# Patient Record
Sex: MALE | Race: WHITE
[De-identification: names, ages, dates, MRNs, and addresses within clinical notes are randomized per-mention and may not be internally consistent; named-entity substitution may affect disease eponyms.]

---
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
## 2018-05-03 ENCOUNTER — HOSPITAL ENCOUNTER (EMERGENCY)
Dept: HOSPITAL 25 - UCEAST | Age: 18
Discharge: HOME | End: 2018-05-03
Payer: SELF-PAY

## 2018-05-03 VITALS — DIASTOLIC BLOOD PRESSURE: 66 MMHG | SYSTOLIC BLOOD PRESSURE: 108 MMHG

## 2018-05-03 DIAGNOSIS — J02.9: Primary | ICD-10-CM

## 2018-05-03 PROCEDURE — 99211 OFF/OP EST MAY X REQ PHY/QHP: CPT

## 2018-05-03 PROCEDURE — 87651 STREP A DNA AMP PROBE: CPT

## 2018-05-03 PROCEDURE — G0463 HOSPITAL OUTPT CLINIC VISIT: HCPCS

## 2018-05-03 NOTE — UC
Prabhu BAIG Angela, scribed for Kennedi Baez MD on 05/03/18 at 0813 .





 General HPI





- HPI Summary


HPI Summary: 





This pt is a 19 y/o male presenting to [facility] c/o sore throat that began last 

night. He states he was able to drink this morning with some difficulty 

secondary to pain. Denies drooling. Pt denies ear pain, sinus pain, nausea, 

vomiting, fever, chills, rash. He denies any sick contacts. His PCP is Dr. Oconnor.


 He is a marijuana user, the last time he used it was last night. 








Patients medication reviewed this visit.





- History of Current Complaint


Stated Complaint: SORE THROAT


Time Seen by Provider: 05/03/18 07:44


Hx Obtained From: Patient


Onset/Duration: Lasting Hours, Still Present


Timing: Constant


Current Severity: Moderate


Pain Intensity: 5


Pain Location at: throat


Character: ache


Aggravating: nothing


Alleviating: nothing


Associated Signs & Symptoms: Positive: Other - POS: sore throat. NEG: ear pain, 

sinus pain, chills, rash, drooling..  Negative: Cough, Fever, Nausea, Vomiting





- Allergy/Home Medications


Allergies/Adverse Reactions: 


 Allergies











Allergy/AdvReac Type Severity Reaction Status Date / Time


 


No Known Allergies Allergy   Verified 05/03/18 07:50














PMH/Surg Hx/FS Hx/Imm Hx


Previously Healthy: Yes


Other Endocrine History: DENIES: diabetes


Other Cardiovascular History: DENIES: HTN





- Surgical History


Surgical History: Yes


Surgery Procedure, Year, and Place: APPENDECTOMY





- Family History


Known Family History: Positive: Diabetes - grandmother and aunt





- Social History


Occupation: Employed Full-time -  in [place]


Lives: With Family


Alcohol Use: None


Substance Use Type: Marijuana


Substance Use Comment - Amount & Last Used: helps to sleep


Smoking Status (MU): Never Smoked Tobacco


Have You Smoked in the Last Year: No





- Immunization History


Vaccination Up to Date: Yes





Review of Systems


Constitutional: Negative


Skin: Negative


Eyes: Negative


ENT: Sore Throat, Other - NEG: ear ache, sinus pain


Respiratory: Negative


Cardiovascular: Negative


Gastrointestinal: Negative


Genitourinary: Negative


Motor: Negative


Neurovascular: Negative


Musculoskeletal: Negative


Neurological: Negative


Psychological: Negative


Is Patient Immunocompromised?: No


All Other Systems Reviewed And Are Negative: Yes





Physical Exam


Triage Information Reviewed: Yes


Appearance: Well-Appearing, No Pain Distress, Well-Nourished


Vital Signs: 


 Initial Vital Signs











Temp  98.3 F   05/03/18 07:52


 


Pulse  66   05/03/18 07:52


 


Resp  16   05/03/18 07:52


 


BP  108/66   05/03/18 07:52


 


Pulse Ox  97   05/03/18 07:52











Vital Signs Reviewed: Yes


Eye Exam: Normal


Eyes: Positive: Conjunctiva Clear


ENT Exam: Normal


ENT: Positive: Hearing grossly normal, Pharyngeal erythema, Nasal drainage, TMs 

normal, Uvula midline.  Negative: Tonsillar swelling, Tonsillar exudate, 

Muffled voice


Dental Exam: Normal


Neck exam: Normal


Neck: Positive: Supple, Nontender, No Lymphadenopathy


Respiratory Exam: Normal


Respiratory: Positive: Chest non-tender, Lungs clear, Normal breath sounds, No 

respiratory distress, No accessory muscle use


Cardiovascular Exam: Normal


Cardiovascular: Positive: RRR, No Murmur


Abdominal Exam: Normal


Abdomen Description: Positive: Nontender, No Organomegaly, Soft


Bowel Sounds: Positive: Present


Musculoskeletal Exam: Normal


Musculoskeletal: Positive: Strength Intact


Neurological Exam: Normal


Neurological: Positive: Alert


Psychological Exam: Normal


Psychological: Positive: Normal Response To Family


Skin Exam: Normal





Course/Dx





- Course


Course Of Treatment: Rapid strep test is negative.  Pt with sore throat x 12 

hours.  hydrate.  motrin/apap.  flonase.  cold fluids.  return precautions.  

secretion precautions





- Differential Dx - Multi-Symptom


Provider Diagnoses: pharyngitis





Discharge





- Sign-Out/Discharge


Documenting (check all that apply): Discharge/Admit/Transfer - Discharge





- Discharge Plan


Condition: Stable


Disposition: HOME


Patient Education Materials:  Pharyngitis (ED)


Referrals: 


Mynor Oconnor MD [Primary Care Provider] - 


Additional Instructions: 


- Okay to alternate ibuprofen (Advil, Motrin) and Tylenol every 3 hours for 

pain. Take with food. Do NOT take for more than 4-5 days


- Okay to gargle and spit every 4 hours as needed for pain


- Stay well hydrated - frequent sips of cold fluids will be soothing to your 

throat (popsicles, jello, ice cream, ice water). Avoid excess caffeine until 

your symptoms have resolved. 


- Do not share eating, drinking utensils. Throw out your toothbrush when your 

symptoms resolved


-Throat infections are spread by oral secretions - do not share eating or 

drinking utensils until you symptoms are resolved.  Clean items that may get 

your secretions such as cell phones, ipads, computer mouse, television remotes. 

Once you start to feel better, change your toothbrush and your pillowcase


- use nasal spray as instructed


-Okay to take a decongestant (Claritin-D, Allegra-D, Zyrtec-D)


- Contact your doctor to arrange a follow-up appointment as needed





- Billing Disposition and Condition


Condition: STABLE


Disposition: HOME





The documentation as recorded by the Prabhu steinberg Angela accurately reflects 

the service I personally performed and the decisions made by me, Kennedi Baez MD.

## 2018-08-01 ENCOUNTER — HOSPITAL ENCOUNTER (EMERGENCY)
Dept: HOSPITAL 25 - ED | Age: 18
Discharge: HOME | End: 2018-08-01
Payer: OTHER GOVERNMENT

## 2018-08-01 VITALS — SYSTOLIC BLOOD PRESSURE: 112 MMHG | DIASTOLIC BLOOD PRESSURE: 65 MMHG

## 2018-08-01 DIAGNOSIS — R19.5: ICD-10-CM

## 2018-08-01 DIAGNOSIS — K76.0: ICD-10-CM

## 2018-08-01 DIAGNOSIS — R07.9: ICD-10-CM

## 2018-08-01 DIAGNOSIS — R20.0: ICD-10-CM

## 2018-08-01 DIAGNOSIS — R10.84: Primary | ICD-10-CM

## 2018-08-01 LAB
BASOPHILS # BLD AUTO: 0 10^3/UL (ref 0–0.2)
EOSINOPHIL # BLD AUTO: 0 10^3/UL (ref 0–0.6)
HCT VFR BLD AUTO: 46 % (ref 42–52)
HGB BLD-MCNC: 15.7 G/DL (ref 14–18)
LYMPHOCYTES # BLD AUTO: 1.5 10^3/UL (ref 1–4.8)
MCH RBC QN AUTO: 29 PG (ref 27–31)
MCHC RBC AUTO-ENTMCNC: 34 G/DL (ref 31–36)
MCV RBC AUTO: 87 FL (ref 80–94)
MONOCYTES # BLD AUTO: 0.4 10^3/UL (ref 0–0.8)
NEUTROPHILS # BLD AUTO: 5.4 10^3/UL (ref 1.5–7.7)
NRBC # BLD AUTO: 0 10^3/UL
NRBC BLD QL AUTO: 0.3
PLATELET # BLD AUTO: 212 10^3/UL (ref 150–450)
RBC # BLD AUTO: 5.34 10^6/UL (ref 4–5.4)
WBC # BLD AUTO: 7.4 10^3/UL (ref 3.5–10.8)

## 2018-08-01 PROCEDURE — 80053 COMPREHEN METABOLIC PANEL: CPT

## 2018-08-01 PROCEDURE — 85025 COMPLETE CBC W/AUTO DIFF WBC: CPT

## 2018-08-01 PROCEDURE — 36415 COLL VENOUS BLD VENIPUNCTURE: CPT

## 2018-08-01 PROCEDURE — 96374 THER/PROPH/DIAG INJ IV PUSH: CPT

## 2018-08-01 PROCEDURE — 84484 ASSAY OF TROPONIN QUANT: CPT

## 2018-08-01 PROCEDURE — 93005 ELECTROCARDIOGRAM TRACING: CPT

## 2018-08-01 PROCEDURE — 74177 CT ABD & PELVIS W/CONTRAST: CPT

## 2018-08-01 PROCEDURE — 83690 ASSAY OF LIPASE: CPT

## 2018-08-01 PROCEDURE — 99283 EMERGENCY DEPT VISIT LOW MDM: CPT

## 2018-08-01 PROCEDURE — 86140 C-REACTIVE PROTEIN: CPT

## 2018-08-01 NOTE — ED
GI/ HPI





- HPI Summary


HPI Summary: 


18-year-old male presents with intermittent abdominal pain for the past couple 

months.  He states pain is generalized.  He states his pain is worst in the 

morning.  He states that he cannot eat in the morning.  He admits to nausea and 

vomiting.  He states his been having looser stools.  No family history of GI 

disorders.  No blood in his stool.  Standing and moving makes it worse.  He has 

been having occasional chest pain.  He states pain last couple seconds.  Most 

recent episode was today a couple hours ago.  It was sharp pain in the center 

of his chest.  He states he got a numbness into the legs.  The symptoms have 

resolved.  He is nonsmoker.  No pain or swelling in calf muscles.








- History of Current Complaint


Chief Complaint: EDAbdPain


Time Seen by Provider: 08/01/18 11:24


Stated Complaint: ABD PAIN/VOMITING


Pain Intensity: 2





- Allergy/Home Medications


Allergies/Adverse Reactions: 


 Allergies











Allergy/AdvReac Type Severity Reaction Status Date / Time


 


No Known Allergies Allergy   Verified 05/03/18 07:50











Home Medications: 


 Home Medications





Dexmethylphenidate HCl [Focalin] 2.5 mg PO DAILY 08/01/18 [History Confirmed 08/ 01/18]











PMH/Surg Hx/FS Hx/Imm Hx


Endocrine/Hematology History: 


   Denies: Hx Diabetes, Hx Thyroid Disease


Cardiovascular History: 


   Denies: Hx Hypertension


Respiratory History: 


   Denies: Hx Asthma, Hx Chronic Obstructive Pulmonary Disease (COPD)


GI History: 


   Denies: Hx Ulcer





- Surgical History


Surgery Procedure, Year, and Place: APPENDECTOMY


Infectious Disease History: No


Infectious Disease History: 


   Denies: Hx Clostridium Difficile, Hx Hepatitis, Hx Human Immunodeficiency 

Virus (HIV), Hx of Known/Suspected MRSA, Hx Tuberculosis, Traveled Outside the 

 in Last 30 Days





- Family History


Known Family History: Positive: Diabetes - grandmother and aunt, Other - no Gi 

diseases


   Negative: Cardiac Disease





- Social History


Alcohol Use: None


Hx Substance Use: No


Substance Use Type: Reports: Marijuana


Substance Use Comment - Amount & Last Used: helps to sleep


Hx Tobacco Use: No


Smoking Status (MU): Never Smoked Tobacco


Have You Smoked in the Last Year: No





Review of Systems


Negative: Fever


Positive: Chest Pain


Negative: Shortness Of Breath


Positive: Abdominal Pain, Vomiting, Diarrhea, Nausea


All Other Systems Reviewed And Are Negative: Yes





Physical Exam


Triage Information Reviewed: Yes


Vital Signs On Initial Exam: 


 Initial Vitals











Temp Pulse Resp BP Pulse Ox


 


 98.4 F   78   18   135/76   100 


 


 08/01/18 11:11  08/01/18 11:11  08/01/18 11:11  08/01/18 11:11  08/01/18 11:11











Vital Signs Reviewed: Yes


Appearance: Positive: Well-Appearing


Skin: Positive: Warm, Dry


Head/Face: Positive: Normal Head/Face Inspection


Eyes: Positive: Normal, Conjunctiva Clear


ENT: Positive: Pharynx normal


Respiratory/Lung Sounds: Positive: Clear to Auscultation, Breath Sounds Present


Cardiovascular: Positive: Normal, RRR


Abdomen Description: Positive: Soft, Other: - mild diffuse tenderness


Bowel Sounds: Positive: Present


Musculoskeletal: Positive: Normal


Neurological: Positive: Normal


Psychiatric: Positive: Normal





Diagnostics





- Vital Signs


 Vital Signs











  Temp Pulse Resp BP Pulse Ox


 


 08/01/18 11:11  98.4 F  78  18  135/76  100














- Laboratory


Result Diagrams: 


 08/01/18 11:44





 08/01/18 11:44


Lab Statement: Any lab studies that have been ordered have been reviewed, and 

results considered in the medical decision making process.





- CT


  ** abd


CT Interpretation: No Acute Changes


CT Interpretation Completed By: Radiologist





- EKG


  ** No standard instances


Cardiac Rate: NL


EKG Rhythm: Sinus Rhythm


EKG Interpretation: sinus rhythm





Re-Evaluation





- Re-Evaluation


  ** First Eval


Re-Evaluation Time: 12:52


Change: Improved


Comment: no abd pain, chest pain, went over lab results





GIGU Course/Dx





- Course


Course Of Treatment: 18-year-old male presents with intermittent abdominal pain 

for the past couple months.  He states pain is generalized.  He states his pain 

is worst in the morning.  He states that he cannot eat in the morning.  He 

admits to nausea and vomiting.  He states his been having looser stools.  No 

family history of GI disorders.  No blood in his stool.  Standing and moving 

makes it worse.  He has been having occasional chest pain.  He states pain last 

couple seconds.  Most recent episode was today a couple hours ago.  It was 

sharp pain in the center of his chest.  He states he got a numbness into the 

legs.  The symptoms have resolved.  He is nonsmoker.  No pain or swelling in 

calf muscles.  on exam mild diffuse abdomen tenderness. ekg normal sinus 

rhythm. troponin neg. wbc and crp normal. CT shows fatty liver.  will try 

zofran and pecpid and give referral to GI. patient understand and agrees with 

plan.





- Diagnoses


Differential Diagnoses - Male: Gastroenteritis (Viral), Irritable Bowel Syndrome

, Urinary Tract Infection, Vomiting


Provider Diagnoses: 


 Abdominal pain, Chest pain








Discharge





- Sign-Out/Discharge


Documenting (check all that apply): Patient Departure





- Discharge Plan


Condition: Good


Disposition: HOME


Prescriptions: 


Famotidine TAB* [Pepcid 20 MG TAB*] 20 mg PO BID #28 tab


Ondansetron ODT TAB* [Zofran 4 MG Odt TAB*] 4 mg PO Q6H PRN #16 tab.odt


 PRN Reason: Nausea


Patient Education Materials:  Abdominal Pain (ED)


Referrals: 


Mynor Oconnor MD [Primary Care Provider] - 


Albin Jarrell MD [Medical Doctor] - 


Additional Instructions: 


try pepcid twice a day for 14 days


zofran every 6 hours as needed for nausea


Follow up with GI


Follow up with primary within 5 days


Return to ED if develop any new or worsening symptoms





- Billing Disposition and Condition


Condition: GOOD


Disposition: Home

## 2018-08-01 NOTE — RAD
CLINICAL HISTORY: abdominal pain 



COMPARISON: May 05, 2008



TECHNIQUE: Multiple contiguous axial CT scans were obtained of the abdomen and pelvis

after the administration of intravenous contrast. Coronal and sagittal multiplanar

reformations are submitted for review.  Oral contrast was administered.  Delayed images

were obtained through the abdomen.



FINDINGS:



LUNG BASES: The lung bases are clear.



LIVER: The liver is diffusely low in attenuation compared to the spleen. There are no

focal hepatic parenchymal masses.

BILE DUCTS: There is no intrahepatic or extrahepatic biliary dilatation.

GALLBLADDER: The gallbladder is normal, without pericholecystic inflammatory change.



PANCREAS: The pancreas is normal, without mass or ductal dilatation.

SPLEEN: Normal in size and appearance.



UPPER GI TRACT: Evaluation of the gastrointestinal tract is limited by incomplete gastric

distention. The upper GI tract is unremarkable.

SMALL BOWEL AND MESENTERY: The small bowel is normal in contour, course, and caliber.

There is no obstruction or dilatation.

COLON: The colon is normal in contour, course, caliber. There is no pericolonic

inflammatory change. The appendix is not clearly visualized. There is no inflammatory

change of the right lower quadrant.



ADRENALS: Normal bilaterally.

KIDNEYS: The kidneys are normal in shape, size, contour, and axis. There is no

hydronephrosis or nephrolithiasis.

BLADDER: The bladder is smooth in contour.



PELVIC ORGANS: The prostate gland is normal. The seminal vesicles are symmetric.



AORTA: The aorta is normal.

IVC: Unremarkable



LYMPH NODES: There is no lymphadenopathy by size criteria.



ABDOMINAL WALL: There is no evidence for abdominal wall hernia.

BONES AND SOFT TISSUES: Unremarkable

OTHER: None



IMPRESSION:

FATTY INFILTRATION OF THE LIVER.

## 2022-10-11 ENCOUNTER — HOSPITAL ENCOUNTER (OUTPATIENT)
Dept: HOSPITAL 76 - EMS | Age: 22
Discharge: ANOTHER HOSPITAL | End: 2022-10-11
Payer: MEDICAID

## 2022-10-11 ENCOUNTER — HOSPITAL ENCOUNTER (EMERGENCY)
Dept: HOSPITAL 76 - ED | Age: 22
Discharge: HOME | End: 2022-10-11
Payer: MEDICAID

## 2022-10-11 VITALS — SYSTOLIC BLOOD PRESSURE: 133 MMHG | DIASTOLIC BLOOD PRESSURE: 85 MMHG

## 2022-10-11 DIAGNOSIS — S09.90XA: ICD-10-CM
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
## 2022-10-11 DIAGNOSIS — S13.4XXA: Primary | ICD-10-CM

## 2022-10-11 DIAGNOSIS — M54.2: Primary | ICD-10-CM

## 2022-10-11 DIAGNOSIS — M25.512: ICD-10-CM

## 2022-10-11 PROCEDURE — 96372 THER/PROPH/DIAG INJ SC/IM: CPT

## 2022-10-11 PROCEDURE — 72125 CT NECK SPINE W/O DYE: CPT

## 2022-10-11 PROCEDURE — 70450 CT HEAD/BRAIN W/O DYE: CPT

## 2022-10-11 PROCEDURE — 99283 EMERGENCY DEPT VISIT LOW MDM: CPT

## 2022-10-11 PROCEDURE — 99284 EMERGENCY DEPT VISIT MOD MDM: CPT
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
## 2022-10-11 NOTE — CT REPORT
PROCEDURE:  HEAD WO

 

INDICATIONS:  ASSAULT, HEAD PAIN

 

TECHNIQUE:  

Noncontrast 4.5 mm thick angled axial sections acquired from the foramen magnum to the vertex.  For r
adiation dose reduction, the following was used:  automated exposure control, adjustment of mA and/or
 kV according to patient size.

 

COMPARISON:  None.

 

FINDINGS:  

Image quality: There is motion artifact limiting evaluation.  

 

CSF spaces:  Basal cisterns are patent.  No extra-axial fluid collections.  Ventricles are normal in 
size and shape.  

 

Brain:  No intracranial hemorrhage, mass, or mass effect.  Gray-white matter interface appears preser
lorenzo.

 

Skull and face:  Calvarium and visualized facial bones are intact, without suspicious lesions.  

 

Sinuses:  Visualized sinuses and mastoids are clear.  

 

IMPRESSION:  

 

1. No definite acute intracranial abnormality.

 

Reviewed by: John Crespo MD on 10/11/2022 11:08 PM PDT

Approved by: John Crespo MD on 10/11/2022 11:08 PM PDT

 

 

Station ID:  [id number]